# Patient Record
Sex: MALE | Race: WHITE | NOT HISPANIC OR LATINO | Employment: FULL TIME | URBAN - METROPOLITAN AREA
[De-identification: names, ages, dates, MRNs, and addresses within clinical notes are randomized per-mention and may not be internally consistent; named-entity substitution may affect disease eponyms.]

---

## 2021-11-30 ENCOUNTER — TELEPHONE (OUTPATIENT)
Dept: SPORTS MEDICINE | Facility: CLINIC | Age: 31
End: 2021-11-30

## 2023-06-05 ENCOUNTER — OFFICE VISIT (OUTPATIENT)
Dept: PRIMARY CARE CLINIC | Facility: CLINIC | Age: 33
End: 2023-06-05
Payer: COMMERCIAL

## 2023-06-05 VITALS
TEMPERATURE: 97 F | SYSTOLIC BLOOD PRESSURE: 99 MMHG | HEART RATE: 55 BPM | HEIGHT: 72 IN | RESPIRATION RATE: 16 BRPM | OXYGEN SATURATION: 99 % | DIASTOLIC BLOOD PRESSURE: 65 MMHG | WEIGHT: 214.63 LBS | BODY MASS INDEX: 29.07 KG/M2

## 2023-06-05 DIAGNOSIS — M54.2 NECK PAIN: ICD-10-CM

## 2023-06-05 DIAGNOSIS — Z00.00 ROUTINE GENERAL MEDICAL EXAMINATION AT A HEALTH CARE FACILITY: Primary | ICD-10-CM

## 2023-06-05 PROCEDURE — 3074F SYST BP LT 130 MM HG: CPT | Mod: CPTII,S$GLB,, | Performed by: INTERNAL MEDICINE

## 2023-06-05 PROCEDURE — 3008F PR BODY MASS INDEX (BMI) DOCUMENTED: ICD-10-PCS | Mod: CPTII,S$GLB,, | Performed by: INTERNAL MEDICINE

## 2023-06-05 PROCEDURE — 3078F PR MOST RECENT DIASTOLIC BLOOD PRESSURE < 80 MM HG: ICD-10-PCS | Mod: CPTII,S$GLB,, | Performed by: INTERNAL MEDICINE

## 2023-06-05 PROCEDURE — 1160F RVW MEDS BY RX/DR IN RCRD: CPT | Mod: CPTII,S$GLB,, | Performed by: INTERNAL MEDICINE

## 2023-06-05 PROCEDURE — 1160F PR REVIEW ALL MEDS BY PRESCRIBER/CLIN PHARMACIST DOCUMENTED: ICD-10-PCS | Mod: CPTII,S$GLB,, | Performed by: INTERNAL MEDICINE

## 2023-06-05 PROCEDURE — 3078F DIAST BP <80 MM HG: CPT | Mod: CPTII,S$GLB,, | Performed by: INTERNAL MEDICINE

## 2023-06-05 PROCEDURE — 99385 PREV VISIT NEW AGE 18-39: CPT | Mod: S$GLB,,, | Performed by: INTERNAL MEDICINE

## 2023-06-05 PROCEDURE — 3008F BODY MASS INDEX DOCD: CPT | Mod: CPTII,S$GLB,, | Performed by: INTERNAL MEDICINE

## 2023-06-05 PROCEDURE — 3074F PR MOST RECENT SYSTOLIC BLOOD PRESSURE < 130 MM HG: ICD-10-PCS | Mod: CPTII,S$GLB,, | Performed by: INTERNAL MEDICINE

## 2023-06-05 PROCEDURE — 1159F MED LIST DOCD IN RCRD: CPT | Mod: CPTII,S$GLB,, | Performed by: INTERNAL MEDICINE

## 2023-06-05 PROCEDURE — 1159F PR MEDICATION LIST DOCUMENTED IN MEDICAL RECORD: ICD-10-PCS | Mod: CPTII,S$GLB,, | Performed by: INTERNAL MEDICINE

## 2023-06-05 PROCEDURE — 99385 PR PREVENTIVE VISIT,NEW,18-39: ICD-10-PCS | Mod: S$GLB,,, | Performed by: INTERNAL MEDICINE

## 2023-06-05 RX ORDER — METHOCARBAMOL 500 MG/1
500 TABLET, FILM COATED ORAL 3 TIMES DAILY
Qty: 30 TABLET | Refills: 0 | Status: SHIPPED | OUTPATIENT
Start: 2023-06-05 | End: 2023-06-15

## 2023-06-05 RX ORDER — IBUPROFEN 200 MG
200 TABLET ORAL EVERY 8 HOURS PRN
COMMUNITY

## 2023-06-05 NOTE — PROGRESS NOTES
Subjective:      Patient ID: Eber Coulter is a 32 y.o. male.    Chief Complaint: Establish Care (Est Care, C/o Lt sided neck pain, radiates to back, contributes to sleeping the wrong way)    HPI:  Not known medical problem presented today to establish care and for wellness exam.    Patient also complains of neck pain x 1 week.  Pain is on the left side of the neck radiating down the trapezius muscle to the shoulder, pain is constant, sharp, worsened with movement of neck improves with rest.  Patient denies any injury.  Patient is taking ibuprofen over-the-counter with some help    Review of Systems   Constitutional:  Negative for chills, diaphoresis, fever, malaise/fatigue and weight loss.   HENT:  Negative for congestion, ear pain, sinus pain, sore throat and tinnitus.    Eyes:  Negative for blurred vision and photophobia.   Respiratory:  Negative for cough, hemoptysis, shortness of breath and wheezing.    Cardiovascular:  Negative for chest pain, palpitations, orthopnea, leg swelling and PND.   Gastrointestinal:  Negative for abdominal pain, blood in stool, constipation, diarrhea, heartburn, melena, nausea and vomiting.   Genitourinary:  Negative for dysuria, frequency and urgency.   Musculoskeletal:  Negative for back pain, myalgias and neck pain.   Skin:  Negative for rash.   Neurological:  Negative for dizziness, tremors, seizures, loss of consciousness and weakness.   Endo/Heme/Allergies:  Negative for polydipsia.   Psychiatric/Behavioral:  Negative for depression and hallucinations. The patient does not have insomnia.    Objective:   BP 99/65 (BP Location: Right arm, Patient Position: Sitting, BP Method: Large (Automatic))   Pulse (!) 55   Temp 97.4 °F (36.3 °C) (Temporal)   Resp 16   Ht 6' (1.829 m)   Wt 97.3 kg (214 lb 9.6 oz)   SpO2 99%   BMI 29.10 kg/m²     Physical Exam  Constitutional:       General: He is not in acute distress.     Appearance: He is not diaphoretic.   Neck:      Thyroid: No  thyromegaly.   Cardiovascular:      Rate and Rhythm: Normal rate and regular rhythm.      Heart sounds: Normal heart sounds. No murmur heard.  Pulmonary:      Effort: Pulmonary effort is normal. No respiratory distress.      Breath sounds: Normal breath sounds. No wheezing.   Abdominal:      General: Bowel sounds are normal. There is no distension.      Palpations: Abdomen is soft.      Tenderness: There is no abdominal tenderness.   Musculoskeletal:      Comments: Tenderness along the upper fibers of trapezius muscle   Lymphadenopathy:      Cervical: No cervical adenopathy.   Neurological:      Mental Status: He is alert and oriented to person, place, and time.   Psychiatric:         Behavior: Behavior normal.         Thought Content: Thought content normal.         Judgment: Judgment normal.     Assessment:       ICD-10-CM ICD-9-CM   1. Routine general medical examination at a health care facility  Z00.00 V70.0   2. Neck pain  M54.2 723.1       Plan:     Patient is here for Annual wellness exam  Will order labs, screen for hepatitis-C and HIV and diabetes  Advised patient about need to lose weight.   Patient BMI of 29 suggest overweight  Patient has neck pain + tenderness of trapezius muscle on examination  Will use muscle relaxant  Advised patient about sedative effect of the medicine    Medication List with Changes/Refills   New Medications    METHOCARBAMOL (ROBAXIN) 500 MG TAB    Take 1 tablet (500 mg total) by mouth 3 (three) times daily. for 10 days   Current Medications    IBUPROFEN (ADVIL,MOTRIN) 200 MG TABLET    Take 200 mg by mouth every 8 (eight) hours as needed for Pain. OTC

## 2025-01-07 ENCOUNTER — OFFICE VISIT (OUTPATIENT)
Dept: ALLERGY | Facility: CLINIC | Age: 35
End: 2025-01-07
Payer: COMMERCIAL

## 2025-01-07 ENCOUNTER — LAB VISIT (OUTPATIENT)
Dept: LAB | Facility: HOSPITAL | Age: 35
End: 2025-01-07
Attending: ALLERGY & IMMUNOLOGY
Payer: COMMERCIAL

## 2025-01-07 VITALS
TEMPERATURE: 99 F | HEIGHT: 72 IN | DIASTOLIC BLOOD PRESSURE: 74 MMHG | BODY MASS INDEX: 28.16 KG/M2 | HEART RATE: 53 BPM | SYSTOLIC BLOOD PRESSURE: 118 MMHG | RESPIRATION RATE: 16 BRPM | WEIGHT: 207.88 LBS

## 2025-01-07 DIAGNOSIS — J32.9 RECURRENT SINUS INFECTIONS: ICD-10-CM

## 2025-01-07 DIAGNOSIS — J31.0 RHINITIS, UNSPECIFIED TYPE: ICD-10-CM

## 2025-01-07 DIAGNOSIS — J31.0 RHINITIS, UNSPECIFIED TYPE: Primary | ICD-10-CM

## 2025-01-07 LAB
IGA SERPL-MCNC: 87 MG/DL (ref 40–350)
IGG SERPL-MCNC: 1079 MG/DL (ref 650–1600)
IGM SERPL-MCNC: 80 MG/DL (ref 50–300)

## 2025-01-07 PROCEDURE — 82784 ASSAY IGA/IGD/IGG/IGM EACH: CPT | Mod: 59 | Performed by: ALLERGY & IMMUNOLOGY

## 2025-01-07 PROCEDURE — 3008F BODY MASS INDEX DOCD: CPT | Mod: CPTII,S$GLB,, | Performed by: ALLERGY & IMMUNOLOGY

## 2025-01-07 PROCEDURE — 3078F DIAST BP <80 MM HG: CPT | Mod: CPTII,S$GLB,, | Performed by: ALLERGY & IMMUNOLOGY

## 2025-01-07 PROCEDURE — 86003 ALLG SPEC IGE CRUDE XTRC EA: CPT | Mod: 59 | Performed by: ALLERGY & IMMUNOLOGY

## 2025-01-07 PROCEDURE — 3074F SYST BP LT 130 MM HG: CPT | Mod: CPTII,S$GLB,, | Performed by: ALLERGY & IMMUNOLOGY

## 2025-01-07 PROCEDURE — 86317 IMMUNOASSAY INFECTIOUS AGENT: CPT | Performed by: ALLERGY & IMMUNOLOGY

## 2025-01-07 PROCEDURE — 99999 PR PBB SHADOW E&M-EST. PATIENT-LVL III: CPT | Mod: PBBFAC,,, | Performed by: ALLERGY & IMMUNOLOGY

## 2025-01-07 PROCEDURE — 99204 OFFICE O/P NEW MOD 45 MIN: CPT | Mod: S$GLB,,, | Performed by: ALLERGY & IMMUNOLOGY

## 2025-01-07 PROCEDURE — 1159F MED LIST DOCD IN RCRD: CPT | Mod: CPTII,S$GLB,, | Performed by: ALLERGY & IMMUNOLOGY

## 2025-01-07 PROCEDURE — 86581 STRPTCS PNEUM ANTB SEROT IA: CPT | Performed by: ALLERGY & IMMUNOLOGY

## 2025-01-07 RX ORDER — FLUTICASONE PROPIONATE 50 MCG
1 SPRAY, SUSPENSION (ML) NASAL DAILY
Qty: 20 ML | Refills: 5 | Status: SHIPPED | OUTPATIENT
Start: 2025-01-07

## 2025-01-07 RX ORDER — AZELASTINE 1 MG/ML
1 SPRAY, METERED NASAL 2 TIMES DAILY
Qty: 20 ML | Refills: 5 | Status: SHIPPED | OUTPATIENT
Start: 2025-01-07 | End: 2026-01-07

## 2025-01-07 NOTE — PROGRESS NOTES
"Subjective:      Patient ID: Eber Coulter is a 34 y.o. male.    Self referral    Chief Complaint:  Allergic Rhinitis       HPI:  1/7/2025: 34 year old male    Allergy immunotherapy until the age of 18 (10-16)- pollen, mold and cats  Every 2 months- "feels like a sinus infection"  Post nasal drip, diarrhea, lethargy    Nasal congestion in the morning  No runny nose  No itchy or watery eyes    He takes Zyrtec or Allegra, which help.    He reports asthma. He last had symptoms as a child.  NO inhaler for the last 15 years  He denies shortness of breath, cough or wheeze.    He denies food allergy.  He denies insect sting allergy.  He denies drug allergies.  He denies atopic dermatitis      Past Medical History:  See above    Family History   Problem Relation Name Age of Onset    Skin cancer Mother          Melanoma    Breast cancer Maternal Grandmother      Cancer Maternal Grandfather          melanoma    Breast cancer Paternal Grandmother      Diabetes Paternal Grandfather          Current Outpatient Medications on File Prior to Visit   Medication Sig Dispense Refill    [DISCONTINUED] ibuprofen (ADVIL,MOTRIN) 200 MG tablet Take 200 mg by mouth every 8 (eight) hours as needed for Pain. OTC       No current facility-administered medications on file prior to visit.        Review of patient's allergies indicates:   Allergen Reactions    Cat/feline products     Mold         Environmental History: Pets in the home: dogs (1).  Tobacco Smoke in Home: no  Review of Systems   Constitutional:  Negative for chills and fever.   HENT:  Positive for congestion, postnasal drip and sneezing. Negative for rhinorrhea.    Eyes:  Negative for discharge and itching.   Respiratory:  Negative for cough, shortness of breath and wheezing.    Skin:  Negative for rash and wound.   Allergic/Immunologic: Positive for environmental allergies and immunocompromised state. Negative for food allergies.   Neurological:  Negative for facial asymmetry and " speech difficulty.   Psychiatric/Behavioral:  Negative for behavioral problems and suicidal ideas.        Objective:   Physical Exam  Constitutional:       General: He is not in acute distress.     Appearance: Normal appearance. He is not ill-appearing, toxic-appearing or diaphoretic.   HENT:      Head: Normocephalic and atraumatic.      Right Ear: Tympanic membrane, ear canal and external ear normal. There is no impacted cerumen.      Left Ear: Tympanic membrane, ear canal and external ear normal. There is no impacted cerumen.      Nose: Nose normal. No congestion or rhinorrhea.      Mouth/Throat:      Mouth: Mucous membranes are moist.      Pharynx: No oropharyngeal exudate or posterior oropharyngeal erythema.   Eyes:      General: No scleral icterus.        Right eye: No discharge.         Left eye: No discharge.      Pupils: Pupils are equal, round, and reactive to light.   Neck:      Thyroid: No thyromegaly.   Cardiovascular:      Rate and Rhythm: Normal rate and regular rhythm.      Heart sounds: Normal heart sounds. No murmur heard.     No friction rub. No gallop.   Pulmonary:      Effort: Pulmonary effort is normal. No respiratory distress.      Breath sounds: Normal breath sounds. No stridor. No wheezing, rhonchi or rales.   Chest:      Chest wall: No tenderness.   Musculoskeletal:         General: Normal range of motion.      Cervical back: Normal range of motion and neck supple. No rigidity or tenderness.   Lymphadenopathy:      Cervical: No cervical adenopathy.   Skin:     General: Skin is warm and dry.      Coloration: Skin is not jaundiced.      Findings: No bruising or rash.   Neurological:      Mental Status: He is alert and oriented to person, place, and time.      Gait: Gait normal.   Psychiatric:         Mood and Affect: Mood normal.         Behavior: Behavior normal.         Thought Content: Thought content normal.         Judgment: Judgment normal.               Unable to skin prick test, as he  is taking antihistamines  Assessment:      1. Rhinitis, unspecified type    2. Recurrent sinus infections        Plan:         Rhinitis, unspecified type  -     Allergen Profile, Zone 6; Future; Expected date: 01/07/2025    Recurrent sinus infections  -     Streptococcus Pneumoniae IgG Antibody (23 Serotypes), MAID; Future; Expected date: 02/04/2025  -     IMMUNOGLOBULINS (IGG, IGA, IGM) QUANTITATIVE; Future; Expected date: 01/07/2025  -     DIPHTHERIA / TETANUS ANTIBODY PANEL; Future; Expected date: 01/07/2025  -     azelastine (ASTELIN) 137 mcg (0.1 %) nasal spray; 1 spray (137 mcg total) by Nasal route 2 (two) times daily.  Dispense: 20 mL; Refill: 5  -     fluticasone propionate (FLONASE) 50 mcg/actuation nasal spray; 1 spray (50 mcg total) by Each Nostril route once daily.  Dispense: 20 mL; Refill: 5       Labs ordered.  Flonase and Astelin    RTC 3-4 weeks     MARILYN DURBIN                  Problems Address                                                 Amount and/or Complexity                                                                      Risk       3           [] 2 or more self-limited or minor problems                      [] Limited                                                                        [] Low                  [] 1 stable chronic illness                                                  Any combination of the two                                               OTC drugs                  []Acute, uncomplicated illness or injury                            Review of prior external notes from unique source           Minor surgery with no risk factors                                                                                                               [] 1 []2  []3+                                                                                                              Review of results from each unique test                                                                                                                [] 1 []2  [] 3+                                                                                                              Order of each unique test                                                                                                               [] 1 []2  [] 3+                                                                                                              Or                                                                                                             [] Assessment requiring an independent historian      4            [] One or more chronic illness with exacerbation,              [] Moderate                                                                      [x] Moderate                 Progression, or side effects of treatment                            -test documents or independent historians                        Prescription drug management                [x]  2 or more stable chronic illnesses                                    [] Independent interpretation of tests                              Minor surgery with identifiable risk                [] 1 undiagnosed new problem with uncertain prognosis    [x] Discussion or management of test results                    elective major surgery                [] 1 acute illness with                systemic symptoms                                                                                                                                                              [] 1 acute complicated injury                                                                                                                                          Elective major surgery                                                                                                                                                                                                                                                                                                                                                                                                   5            [] 1 or more chronic illnesses with severe exacerbation,     [] Extensive(two from below)                                         [] High                                                                                                               [] Independent interpretation of results                         Drug therapy requiring intensive                                                                                                               []Discussion of management or test interpretation           monitoring                                                                                                                                                                                                       Decision to de-escalate care                 [] 1 acute or chronic illness or injury that poses a threat                                                                                               Decision regarding hospitalization

## 2025-01-09 LAB
DIPHTHERIA IGG VALUE: 0.48 IU/ML
DIPHTHERIA TOXOID IGG ANTIBODY: POSITIVE
TETANUS TOXOID IGG AB: POSITIVE
TETANUS TOXOID IGG VALUE: 1.99 IU/ML

## 2025-01-10 LAB
A ALTERNATA IGE QN: 0.57 KU/L
A FUMIGATUS IGE QN: 0.19 KU/L
ALLERGEN BOXELDER MAPLE TREE IGE: <0.1 KU/L
ALLERGEN MULBERRY TREE IGE: <0.1 KU/L
ALLERGEN PIGWEED IGE: 0.18 KU/L
ALLERGEN WALNUT TREE IGE: <0.1 KU/L
BERMUDA GRASS IGE QN: 0.18 KU/L
C HERBARUM IGE QN: <0.1 KU/L
CAT DANDER IGE QN: <0.1 KU/L
COMMON RAGWEED IGE QN: 0.27 KU/L
D FARINAE IGE QN: 2.15 KU/L
D PTERONYSS IGE QN: 1.54 KU/L
DEPRECATED TIMOTHY IGE RAST QL: ABNORMAL
DOG DANDER IGE QN: <0.1 KU/L
ELDER IGE QN: <0.1 KU/L
IGE: 17.4 IU/ML
MOUSE URINE PROT IGE QN: <0.1 KU/L
MT JUNIPER IGE QN: 0.24 KU/L
P NOTATUM IGE QN: <0.1 KU/L
PECAN/HICK TREE IGE QN: <0.1 KU/L
RAST ALLERGEN INTERPRETATION: ABNORMAL
RAST CLASS: ABNORMAL
ROACH IGE QN: <0.1 KU/L
SILVER BIRCH IGE QN: 0.33 KU/L
TIMOTHY IGE QN: 0.37 KU/L
WHITE ELM IGE QN: <0.1 KU/L
WHITE OAK IGE QN: 1.38 KU/L

## 2025-01-13 LAB
IMMUNOLOGIST REVIEW: NORMAL
S PN DA SERO 19F IGG SER-MCNC: 5.8 MCG/ML
S PNEUM DA 1 IGG SER-MCNC: 0.9 MCG/ML
S PNEUM DA 10A IGG SER-MCNC: 0.9 MCG/ML
S PNEUM DA 11A IGG SER-MCNC: 0.3 MCG/ML
S PNEUM DA 12F IGG SER-MCNC: NORMAL MCG/ML
S PNEUM DA 14 IGG SER-MCNC: 1.2 MCG/ML
S PNEUM DA 15B IGG SER-MCNC: 1.8 MCG/ML
S PNEUM DA 17F IGG SER-MCNC: 1.2 MCG/ML
S PNEUM DA 18C IGG SER-MCNC: 0.2 MCG/ML
S PNEUM DA 19A IGG SER-MCNC: 17.3 MCG/ML
S PNEUM DA 2 IGG SER-MCNC: 0.7 MCG/ML
S PNEUM DA 20A IGG SER-MCNC: 3.3 MCG/ML
S PNEUM DA 22F IGG SER-MCNC: 3.5 MCG/ML
S PNEUM DA 23F IGG SER-MCNC: 1.1 MCG/ML
S PNEUM DA 3 IGG SER-MCNC: 0.4 MCG/ML
S PNEUM DA 33F IGG SER-MCNC: 4.4 MCG/ML
S PNEUM DA 4 IGG SER-MCNC: 0.3 MCG/ML
S PNEUM DA 5 IGG SER-MCNC: 0.3 MCG/ML
S PNEUM DA 6B IGG SER-MCNC: 1.3 MCG/ML
S PNEUM DA 7F IGG SER-MCNC: 0.9 MCG/ML
S PNEUM DA 8 IGG SER-MCNC: 1.7 MCG/ML
S PNEUM DA 9N IGG SER-MCNC: 0.5 MCG/ML
S PNEUM DA 9V IGG SER-MCNC: 0.4 MCG/ML

## 2025-01-31 ENCOUNTER — OFFICE VISIT (OUTPATIENT)
Dept: ALLERGY | Facility: CLINIC | Age: 35
End: 2025-01-31
Payer: COMMERCIAL

## 2025-01-31 VITALS
SYSTOLIC BLOOD PRESSURE: 130 MMHG | BODY MASS INDEX: 28.72 KG/M2 | OXYGEN SATURATION: 89 % | HEART RATE: 60 BPM | WEIGHT: 212.06 LBS | DIASTOLIC BLOOD PRESSURE: 68 MMHG | HEIGHT: 72 IN

## 2025-01-31 DIAGNOSIS — J30.89 ALLERGIC RHINITIS DUE TO AMERICAN HOUSE DUST MITE: ICD-10-CM

## 2025-01-31 DIAGNOSIS — J30.1 SEASONAL ALLERGIC RHINITIS DUE TO POLLEN: Primary | ICD-10-CM

## 2025-01-31 DIAGNOSIS — R89.9 ABNORMAL LABORATORY TEST RESULT: ICD-10-CM

## 2025-01-31 DIAGNOSIS — J30.89 ALLERGIC RHINITIS DUE TO MOLD: ICD-10-CM

## 2025-01-31 PROCEDURE — 3075F SYST BP GE 130 - 139MM HG: CPT | Mod: CPTII,S$GLB,, | Performed by: ALLERGY & IMMUNOLOGY

## 2025-01-31 PROCEDURE — 90732 PPSV23 VACC 2 YRS+ SUBQ/IM: CPT | Mod: S$GLB,,, | Performed by: ALLERGY & IMMUNOLOGY

## 2025-01-31 PROCEDURE — 1159F MED LIST DOCD IN RCRD: CPT | Mod: CPTII,S$GLB,, | Performed by: ALLERGY & IMMUNOLOGY

## 2025-01-31 PROCEDURE — 99215 OFFICE O/P EST HI 40 MIN: CPT | Mod: 25,S$GLB,, | Performed by: ALLERGY & IMMUNOLOGY

## 2025-01-31 PROCEDURE — G2211 COMPLEX E/M VISIT ADD ON: HCPCS | Mod: S$GLB,,, | Performed by: ALLERGY & IMMUNOLOGY

## 2025-01-31 PROCEDURE — 3008F BODY MASS INDEX DOCD: CPT | Mod: CPTII,S$GLB,, | Performed by: ALLERGY & IMMUNOLOGY

## 2025-01-31 PROCEDURE — 3078F DIAST BP <80 MM HG: CPT | Mod: CPTII,S$GLB,, | Performed by: ALLERGY & IMMUNOLOGY

## 2025-01-31 PROCEDURE — 99999 PR PBB SHADOW E&M-EST. PATIENT-LVL III: CPT | Mod: PBBFAC,,, | Performed by: ALLERGY & IMMUNOLOGY

## 2025-01-31 PROCEDURE — 90471 IMMUNIZATION ADMIN: CPT | Mod: S$GLB,,, | Performed by: ALLERGY & IMMUNOLOGY

## 2025-01-31 NOTE — PROGRESS NOTES
"Subjective:      Patient ID: Eber Coulter is a 34 y.o. male.        Chief Complaint:  Follow-up        HPI: 1/31/2024- follow up  NO runny nose,or nasal congestion  NO infections      HPI:  1/7/2025: 34 year old male    Allergy immunotherapy until the age of 18 (10-16)- pollen, mold and cats  Every 2 months- "feels like a sinus infection"  Post nasal drip, diarrhea, lethargy    Nasal congestion in the morning  No runny nose  No itchy or watery eyes    He takes Zyrtec or Allegra, which help.    He reports asthma. He last had symptoms as a child.  NO inhaler for the last 15 years  He denies shortness of breath, cough or wheeze.    He denies food allergy.  He denies insect sting allergy.  He denies drug allergies.  He denies atopic dermatitis      Past Medical History:  See above    Family History   Problem Relation Name Age of Onset    Skin cancer Mother          Melanoma    Breast cancer Maternal Grandmother      Cancer Maternal Grandfather          melanoma    Breast cancer Paternal Grandmother      Diabetes Paternal Grandfather          Current Outpatient Medications on File Prior to Visit   Medication Sig Dispense Refill    azelastine (ASTELIN) 137 mcg (0.1 %) nasal spray 1 spray (137 mcg total) by Nasal route 2 (two) times daily. 20 mL 5    fluticasone propionate (FLONASE) 50 mcg/actuation nasal spray 1 spray (50 mcg total) by Each Nostril route once daily. 20 mL 5     No current facility-administered medications on file prior to visit.        Review of patient's allergies indicates:   Allergen Reactions    Cat/feline products     Mold         Environmental History: Pets in the home: dogs (1).  Tobacco Smoke in Home: no  Review of Systems   Constitutional:  Negative for chills and fever.   HENT:  Negative for congestion, postnasal drip, rhinorrhea and sneezing.    Eyes:  Negative for discharge and itching.   Respiratory:  Negative for cough, shortness of breath and wheezing.    Skin:  Negative for rash and " wound.   Allergic/Immunologic: Positive for environmental allergies. Negative for food allergies and immunocompromised state.   Neurological:  Negative for facial asymmetry and speech difficulty.   Psychiatric/Behavioral:  Negative for behavioral problems and suicidal ideas.        Objective:   Physical Exam  Constitutional:       General: He is not in acute distress.     Appearance: Normal appearance. He is not ill-appearing, toxic-appearing or diaphoretic.   HENT:      Head: Normocephalic and atraumatic.      Right Ear: Tympanic membrane, ear canal and external ear normal. There is no impacted cerumen.      Left Ear: Tympanic membrane, ear canal and external ear normal. There is no impacted cerumen.      Nose: Nose normal. No congestion or rhinorrhea.      Mouth/Throat:      Mouth: Mucous membranes are moist.      Pharynx: No oropharyngeal exudate or posterior oropharyngeal erythema.   Eyes:      General: No scleral icterus.        Right eye: No discharge.         Left eye: No discharge.      Pupils: Pupils are equal, round, and reactive to light.   Neck:      Thyroid: No thyromegaly.   Cardiovascular:      Rate and Rhythm: Normal rate and regular rhythm.      Heart sounds: Normal heart sounds. No murmur heard.     No friction rub. No gallop.   Pulmonary:      Effort: Pulmonary effort is normal. No respiratory distress.      Breath sounds: Normal breath sounds. No stridor. No wheezing, rhonchi or rales.   Chest:      Chest wall: No tenderness.   Musculoskeletal:         General: Normal range of motion.      Cervical back: Normal range of motion and neck supple. No rigidity or tenderness.   Lymphadenopathy:      Cervical: No cervical adenopathy.   Skin:     General: Skin is warm and dry.      Coloration: Skin is not jaundiced.      Findings: No bruising or rash.   Neurological:      Mental Status: He is alert and oriented to person, place, and time.      Gait: Gait normal.   Psychiatric:         Mood and Affect:  Mood normal.         Behavior: Behavior normal.         Thought Content: Thought content normal.         Judgment: Judgment normal.         Strep pneumococcal - 10/22 titers      Assessment:      1. Seasonal allergic rhinitis due to pollen    2. Allergic rhinitis due to mold    3. Allergic rhinitis due to [employer] house dust mite    4. Abnormal laboratory test result          Plan:         Seasonal allergic rhinitis due to pollen    Allergic rhinitis due to mold    Allergic rhinitis due to [employer] house dust mite    Abnormal laboratory test result  -     pneumococcal vaccine (PNEUMOVAX-23) injection 0.5 mL  -     Streptococcus Pneumoniae IgG Antibody (23 Serotypes), MAID; Future; Expected date: 02/28/2025      He feels allergic rhinitis symptoms are well managed with medications.      PPSV23 given today  Will check strep pneumococcal titers 4 weeks from now  Labs ordered.  Flonase and Astelin    Visit today included increased complexity associated with the care of the episodic problem  Allergic Rhinitis addressed and managing the longitudinal care of the patient due to the serious and/or complex managed problem(s)  Allergic Rhinitis.     RTC 8 weeks     MARILYN DURBIN spent a total of 42 minutes on the day of the visit.This includes face to face time and non-face to face time preparing to see the patient (eg, review of tests), obtaining and/or reviewing separately obtained history, documenting clinical information in the electronic or other health record, independently interpreting results and communicating results to the patient/family/caregiver, or care coordinator.